# Patient Record
Sex: FEMALE | Race: WHITE | Employment: UNEMPLOYED | RURAL
[De-identification: names, ages, dates, MRNs, and addresses within clinical notes are randomized per-mention and may not be internally consistent; named-entity substitution may affect disease eponyms.]

---
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
## 2024-06-27 ENCOUNTER — OFFICE VISIT (OUTPATIENT)
Age: 19
End: 2024-06-27

## 2024-06-27 VITALS
DIASTOLIC BLOOD PRESSURE: 76 MMHG | RESPIRATION RATE: 16 BRPM | HEART RATE: 73 BPM | BODY MASS INDEX: 20.4 KG/M2 | OXYGEN SATURATION: 99 % | HEIGHT: 67 IN | TEMPERATURE: 98.2 F | WEIGHT: 130 LBS | SYSTOLIC BLOOD PRESSURE: 115 MMHG

## 2024-06-27 DIAGNOSIS — R30.0 DYSURIA: Primary | ICD-10-CM

## 2024-06-27 LAB
BILIRUBIN, URINE, POC: NEGATIVE
BLOOD URINE, POC: NEGATIVE
GLUCOSE URINE, POC: NEGATIVE
KETONES, URINE, POC: NEGATIVE
LEUKOCYTE ESTERASE, URINE, POC: NEGATIVE
NITRITE, URINE, POC: NEGATIVE
PH, URINE, POC: 7 (ref 4.6–8)
PROTEIN,URINE, POC: NEGATIVE
SPECIFIC GRAVITY, URINE, POC: 1.03 (ref 1–1.03)
URINALYSIS CLARITY, POC: CLEAR
URINALYSIS COLOR, POC: YELLOW
UROBILINOGEN, POC: NORMAL

## 2024-06-27 NOTE — PROGRESS NOTES
Alexandra Noel (:  2005) is a 19 y.o. female,{New vs Established:[id number]::\"Established patient\"}, here for evaluation of the following chief complaint(s):  Urinary Tract Infection (Sxs burning sensation, pee has a foul odor, cramps, hx of uti's. Took azo for sxs but has not helped)        Urinary Tract Infection  Patient complains of {UTI sx:96589} She has had symptoms for {0-10:78004} {time units:11}. Patient also complains of {ros UTI:18834}. Patient denies {ros UTI:88909}. Patient {does/does not:62977} have a history of recurrent UTI.  Patient {does/does not:50925} have a history of pyelonephritis.     ASSESSMENT/PLAN:  Visit Diagnoses and Associated Orders       Dysuria    -  Primary    AMB POC URINALYSIS DIP STICK MANUAL W/O MICRO [56916 CPT(R)]                    Follow up in *** days if symptoms persist or if symptoms worsen.    SUBJECTIVE/OBJECTIVE:    19 y.o. female presents with symptoms of   {Uro CC:60781}         Vitals:    24 0925   BP: 115/76   Site: Left Upper Arm   Position: Sitting   Cuff Size: Medium Adult   Pulse: 73   Resp: 16   Temp: 98.2 °F (36.8 °C)   TempSrc: Oral   SpO2: 99%   Weight: 59 kg (130 lb)   Height: 1.702 m (5' 7\")       Results for orders placed or performed in visit on 24   AMB POC URINALYSIS DIP STICK MANUAL W/O MICRO   Result Value Ref Range    Color (UA POC) Yellow     Clarity (UA POC) Clear     Glucose, Urine, POC Negative     Bilirubin, Urine, POC Negative     Ketones, Urine, POC Negative     Specific Gravity, Urine, POC 1.030 1.001 - 1.035    Blood (UA POC) Negative     pH, Urine, POC 7.0 4.6 - 8.0    Protein, Urine, POC Negative     Urobilinogen, POC 0.2 mg/dL     Nitrite, Urine, POC Negative     Leukocyte Esterase, Urine, POC Negative           Physical Exam    We discussed when to utilize emergency services. Patient verbalized understanding.    An electronic signature was used to authenticate this note.      Misbah Kumar, APRN - CNP

## 2024-06-27 NOTE — PATIENT INSTRUCTIONS
We will call you with your culture results.  Continue to drink plenty of water, and continue with cranberry supplement if helpful to you.     Return to UC in 1-2 days if symptoms persist.

## 2024-06-27 NOTE — PROGRESS NOTES
Alexandra Noel (:  2005) is a 19 y.o. female,New patient, here for evaluation of the following chief complaint(s):  Urinary Tract Infection (Sxs burning sensation, pee has a foul odor, cramps, hx of uti's. Took azo for sxs but has not helped)        Urinary Tract Infection  Patient complains of dysuria She has had symptoms for a few days. Patient also complains of  foul odor, cramping . Patient denies congestion, cough, fever, headache, rhinitis, sorethroat, stomach ache, and vaginal discharge. Patient does have a history of recurrent UTI.  Patient does not have a history of pyelonephritis.     ASSESSMENT/PLAN:  Visit Diagnoses and Associated Orders       Dysuria    -  Primary    AMB POC URINALYSIS DIP STICK MANUAL W/O MICRO [69404 CPT(R)]      Culture, Urine [77244 Custom]   - Future Order               Results for orders placed or performed in visit on 24   AMB POC URINALYSIS DIP STICK MANUAL W/O MICRO   Result Value Ref Range    Color (UA POC) Yellow     Clarity (UA POC) Clear     Glucose, Urine, POC Negative     Bilirubin, Urine, POC Negative     Ketones, Urine, POC Negative     Specific Gravity, Urine, POC 1.030 1.001 - 1.035    Blood (UA POC) Negative     pH, Urine, POC 7.0 4.6 - 8.0    Protein, Urine, POC Negative     Urobilinogen, POC 0.2 mg/dL     Nitrite, Urine, POC Negative     Leukocyte Esterase, Urine, POC Negative        Sending urine to be [facility] at [facility]  Will call with results of culture and determine need for antibiotics and which antibiotic she would need.   Follow up in 7 days if symptoms persist or if symptoms worsen with UC or PCP.    SUBJECTIVE/OBJECTIVE:    19 y.o. female presents with symptoms of  Urinary Tract Infection  Patient complains of dysuria She has had symptoms for a few days. Patient also complains of foul odor, cramping. Patient denies congestion, cough, fever, headache, rhinitis, sorethroat, stomach ache, and vaginal discharge. Patient does have a history of

## 2024-06-30 LAB
BACTERIA SPEC CULT: ABNORMAL
CC UR VC: ABNORMAL
SERVICE CMNT-IMP: ABNORMAL

## 2024-07-01 DIAGNOSIS — N30.00 ACUTE CYSTITIS WITHOUT HEMATURIA: Primary | ICD-10-CM

## 2024-07-01 RX ORDER — CEPHALEXIN 500 MG/1
500 CAPSULE ORAL 2 TIMES DAILY
Qty: 14 CAPSULE | Refills: 0 | Status: SHIPPED | OUTPATIENT
Start: 2024-07-01 | End: 2024-07-08